# Patient Record
Sex: MALE | Race: WHITE | NOT HISPANIC OR LATINO | Employment: FULL TIME | URBAN - METROPOLITAN AREA
[De-identification: names, ages, dates, MRNs, and addresses within clinical notes are randomized per-mention and may not be internally consistent; named-entity substitution may affect disease eponyms.]

---

## 2022-11-07 ENCOUNTER — HOSPITAL ENCOUNTER (EMERGENCY)
Facility: HOSPITAL | Age: 52
Discharge: HOME | End: 2022-11-07
Attending: EMERGENCY MEDICINE | Admitting: EMERGENCY MEDICINE

## 2022-11-07 VITALS
TEMPERATURE: 97.4 F | BODY MASS INDEX: 36.03 KG/M2 | HEART RATE: 61 BPM | SYSTOLIC BLOOD PRESSURE: 160 MMHG | HEIGHT: 72 IN | OXYGEN SATURATION: 95 % | RESPIRATION RATE: 16 BRPM | WEIGHT: 266 LBS | DIASTOLIC BLOOD PRESSURE: 91 MMHG
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
## 2022-11-07 DIAGNOSIS — Z20.3 NEED FOR POST EXPOSURE PROPHYLAXIS FOR RABIES: ICD-10-CM

## 2022-11-07 RX ADMIN — RABIES VIRUS STRAIN PM-1503-3M ANTIGEN (PROPIOLACTONE INACTIVATED) AND WATER 1 ML: KIT at 20:54

## 2022-11-07 RX ADMIN — RABIES IMMUNE GLOBULIN (HUMAN) 2430 UNITS: 300 INJECTION, SOLUTION INFILTRATION; INTRAMUSCULAR at 20:45

## 2022-11-08 NOTE — ED PROVIDER NOTES
History  Chief Complaint   Patient presents with   • Cat Scratch      Patient was scratched by feral cat on September 30th, , patient went to [facility] and they did not recommend getting the rabies vaccine  Patient states he has pain , and itching in his right wrist where he was scratched  Patient is requesting his rabies vaccine for today's visit  Patient is a 14-year-old male presents for evaluation for a cat scratch  Patient says he was scratched by a feral cat on 09/30  He says he was given antibiotics at that time but did not receive the rabies vaccine  He says he went to [address] [place] [address] today who did not recommend getting the vaccine  He says that he called the board of health who said that he should come in for the rabies vaccine  He says he noticed some burning and itching to the area today  He denies any redness, fevers  On exam, there is no redness or tenderness to the area  None       No past medical history on file  No past surgical history on file  No family history on file  I have reviewed and agree with the history as documented  No existing history information found  No existing history information found  Review of Systems   Constitutional: Negative for chills, fever and unexpected weight change  HENT: Negative for congestion, sore throat and trouble swallowing  Eyes: Negative for pain, discharge and itching  Respiratory: Negative for cough, chest tightness, shortness of breath and wheezing  Cardiovascular: Negative for chest pain, palpitations and leg swelling  Gastrointestinal: Negative for abdominal pain, blood in stool, diarrhea, nausea and vomiting  Endocrine: Negative for polyuria  Genitourinary: Negative for difficulty urinating, dysuria, frequency and hematuria  Musculoskeletal: Negative for arthralgias and back pain  Neurological: Negative for dizziness, syncope, weakness, light-headedness and headaches         Physical Exam  Physical Exam  Vitals and nursing note reviewed  Constitutional:       General: He is not in acute distress  Appearance: He is well-developed  HENT:      Head: Normocephalic and atraumatic  Right Ear: External ear normal       Left Ear: External ear normal    Eyes:      Conjunctiva/sclera: Conjunctivae normal       Pupils: Pupils are equal, round, and reactive to light  Cardiovascular:      Rate and Rhythm: Normal rate and regular rhythm  Heart sounds: Normal heart sounds  No murmur heard  No friction rub  No gallop  Pulmonary:      Effort: Pulmonary effort is normal  No respiratory distress  Breath sounds: Normal breath sounds  No wheezing or rales  Abdominal:      General: Bowel sounds are normal  There is no distension  Palpations: Abdomen is soft  Tenderness: There is no abdominal tenderness  There is no guarding  Musculoskeletal:         General: No tenderness or deformity  Normal range of motion  Cervical back: Normal range of motion  Lymphadenopathy:      Cervical: No cervical adenopathy  Skin:     General: Skin is warm and dry  Neurological:      General: No focal deficit present  Mental Status: He is alert and oriented to person, place, and time  Mental status is at baseline  Cranial Nerves: No cranial nerve deficit  Sensory: No sensory deficit  Motor: No weakness or abnormal muscle tone     Psychiatric:         Behavior: Behavior normal          Vital Signs  ED Triage Vitals [11/07/22 1945]   Temperature Pulse Respirations Blood Pressure SpO2   (!) 97 4 °F (36 3 °C) 61 16 160/91 95 %      Temp Source Heart Rate Source Patient Position - Orthostatic VS BP Location FiO2 (%)   Tympanic Monitor Sitting Left arm --      Pain Score       --           Vitals:    11/07/22 1945   BP: 160/91   Pulse: 61   Patient Position - Orthostatic VS: Sitting         Visual Acuity      ED Medications  Medications   rabies vaccine, human diploid IM injection 1 mL (1 mL Intramuscular Given 11/7/22 2054)   rabies immune globulin, human (HyperRAB) injection 2,430 Units (2,430 Units Infiltration Given 11/7/22 2045)       Diagnostic Studies  Results Reviewed     None                 No orders to display              Procedures  Procedures         ED Course  ED Course as of 11/08/22 0032   Mon Nov 07, 2022 2011 [facility]-Infectious Disease-Call (Etienne Holcomb([facility]))  [facility] incredibly low risk, and it has been more than a month, but if CARROL told him, I guess you should do it  Give both but call CARROL tomorrow to confirm what he told you  MDM  Number of Diagnoses or Management Options  Cat scratch  Need for post exposure prophylaxis for rabies  Diagnosis management comments: 66-year-old male presenting for rabies vaccination  Was scratched by a feral cat at the end of September  Did not receive the rabies vaccine at that time  Was told by the board of health to come here for rabies vaccination  Reach out to ID who said to give the vaccine and immunoglobulin if the department of health recommended it  Patient was given the rabies vaccine an IG  Told to return on days 3, 7 and 14 for repeat vaccination      Disposition  Final diagnoses:   Cat scratch   Need for post exposure prophylaxis for rabies     Time reflects when diagnosis was documented in both MDM as applicable and the Disposition within this note     Time User Action Codes Description Comment    11/7/2022  8:36 PM Bri Rooney Add [[id number]] Cat scratch     11/7/2022  8:36 PM Bri Rooney Add [Z20 3] Need for post exposure prophylaxis for rabies       ED Disposition     ED Disposition   Discharge    Condition   Stable    Date/Time   Mon Nov 7, 2022  8:36 PM    Comment   Marla Perone discharge to home/self care                 Follow-up Information     Follow up With Specialties Details Why Contact Info Additional [address] [facility] Emergency Medicine Go to  On 11/10, 11/14, 11/21 for repeat rabies vaccinations [address] [place] [id number]  [facility] Emergency Department, [address] [facility], Mina sandhu, [address] [facility]          There are no discharge medications for this patient  No discharge procedures on file      PDMP Review     None          ED Provider  Electronically Signed by           Arnetha Lombard, DO  11/08/22 0032